# Patient Record
Sex: MALE | Race: WHITE | HISPANIC OR LATINO | Employment: FULL TIME | URBAN - METROPOLITAN AREA
[De-identification: names, ages, dates, MRNs, and addresses within clinical notes are randomized per-mention and may not be internally consistent; named-entity substitution may affect disease eponyms.]

---

## 2022-10-26 ENCOUNTER — LAB (OUTPATIENT)
Dept: LAB | Facility: CLINIC | Age: 37
End: 2022-10-26

## 2022-10-26 DIAGNOSIS — Z31.448 ENCOUNTER FOR OTHER GENETIC TESTING OF MALE FOR PROCREATIVE MANAGEMENT: Primary | ICD-10-CM

## 2022-10-26 DIAGNOSIS — Z31.448 ENCOUNTER FOR OTHER GENETIC TESTING OF MALE FOR PROCREATIVE MANAGEMENT: ICD-10-CM

## 2022-10-26 PROCEDURE — 36415 COLL VENOUS BLD VENIPUNCTURE: CPT

## 2022-10-26 NOTE — PROGRESS NOTES
[facility]  Genetic Counseling Consult    Patient:  Gabriel Rosales YOB: 1985   Date of Service:  10/26/22      Gabriel was seen at the [facility] for genetic consultation to discuss the option of carrier screening.         Impression/Plan:   1. Gabriel elected to pursue expanded carrier screening as part of his partner, Lisa's ongoing pregnancy management. A sample was drawn today and sent to [facility]. Results are expected within 2-3 weeks, and will be released in [employer]. We will contact him to discuss the results. Gabriel provided verbal permission for results to be left in his voicemail. Authorization to share protected health information was signed today for us to discuss results with his partner.      Expanded carrier screening for mutations in a large panel of genes associated with autosomal recessive conditions including cystic fibrosis, thalassemias, hearing loss, spinal muscular atrophy, and others, is now available. We also reviewed that expanded carrier screening can assess for common X-linked recessive disorders such as Fragile X syndrome.       We discussed that expanded carrier screening is designed to identify carrier status for conditions that are primarily childhood or adolescent onset. Expanded carrier screening does not evaluate for adult-onset conditions such as hereditary cancer syndromes, dementia/ Alzheimer's disease, or cardiovascular disease risk factors. Additionally, expanded carrier screening is not comprehensive for all known genetic diseases or inherited conditions. It will not intentionally screen for autosomal dominant conditions. This is a screening test, and residual carrier status risk figures will be provided to the patient after results become available. Carrier screening is not meant to diagnose the patient with a condition, and generally carriers are asymptomatic. However, certain genes  may confer increased risks for various health concerns in carriers (including, but not limited to: DAWIT, DMD, FMR1).      We reviewed that carrier screening will report on variants classified by the lab as pathogenic or likely pathogenic. Although carrier status does not change over time, it is possible that a variant could be reclassified as more information about the variant is learned. If this occurs, the couple will be contacted and a new risk assessment will be provided.     It was a pleasure to be involved with Gabriel alfredito. Face-to-face time of the meeting was 68 minutes.    Sugey Walters MS, [facility]  Licensed Genetic Counselor  [facility]  Maternal Fetal Medicine  [email or web address]  [phone]

## 2022-11-15 ENCOUNTER — TELEPHONE (OUTPATIENT)
Dept: MATERNAL FETAL MEDICINE | Facility: CLINIC | Age: 37
End: 2022-11-15

## 2022-11-15 NOTE — TELEPHONE ENCOUNTER
November 15, 2022    Gabriel returned my call and we reviewed the information described below. Low reproductive risk from carrier screening.     Sugey Walters MS, [facility]  Licensed Genetic Counselor  [facility]  Maternal Fetal Medicine  [email or web address]  [phone]

## 2022-11-15 NOTE — TELEPHONE ENCOUNTER
November 15, 2022     I called Gabriel to discuss the results of his and his partner partner, Shabnam's, comprehensive carrier screening.      The couple were not mutually positive for any of the conditions screened and Shabnam did not screen positive for any x-linked conditions. This significantly reduces the couple's reproductive risk for these conditions.      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 Cilia are involved with cell movement and signaling. There are many different types of BBS caused by mutations in different genes.    Symptoms of BBS generally include alice-cone dystrophy, which leads to progressive vision loss, kidney anomalies, genital differences, intellectual disability, infertility, and polydactyly. Some individuals with this condition may have internal organs that are reversed (either situs inversus totalis or heterotaxy).    Symptoms of BBS can vary widely and some individuals may not have obvious symptoms.    Since Gabriel was NOT identified to be a carrier of this condition, the couple's residual reproductive risk is 1 in 141,200.     Primary hyperoxaluria type 1 (AGXT: c.33dup):    Individuals with this condition produce too much oxalate/oxalic acid. Oxalate combines with calcium to form crystals in the kidneys and other organs.     Symptoms include kidney stones and reduced kidney function before age 25. In the most severe cases, symptoms begin at four to six months of age. There are even more mild forms in which individuals may not present until adulthood.     Approximately half of affected individuals will have kidney failure by age 25, leading to the deposition of oxalate crystals in the tissues of the body. Without treatment, this condition can be fatal.     Treatment may include liver and kidney  "transplant.    Since Gabriel was NOT identified to be a carrier of this condition, the residual reproductive risk is 1 in 53,600.     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     There are different forms of this condition, with type 0 being the most severe and type IV being the most mild. Type 0 SMA presents in utero and type IV presents in adulthood.     The different types of SMA are dependent on the copies of SMN2, a gene that is very similar to SMN1.     Deletions of the SMN1 gene are the most common causes of SMA, though point mutations are also possible. Typically, healthy individuals have two copies of SMN1, though sometimes they can have more.     Occasionally, someone has two copies of SMN1 on one chromosome and no copies of the gene on the other chromosome. These individuals are called \"silent carriers\" as they have the typical copies of the gene, but have the possibility of passing on no copies of the gene. They are also called 2+0 carriers. When the c.*3+80T>G is present, the 2+0 configuration is more likely.     For someone of  descent, the presence of the SNP increases the chance to be a 2+0 carrier to 1 in 42.     Since Shabnam was NOT identified to be a carrier, the residual reproductive risk is 1 in 147,840.     Any of the couple's children will have up to a 50% chance of being carriers of these conditions. This is also true for the couple's siblings. Other family members could also be at risk to be carriers and the couple was encouraged to share this information with their families.      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 " This variant is classified as benign by [employer]. In combination with a classic GALT variant, it may reduce enzyme activity which can trigger a positive for galactosemia on [place] screening. However, it does not require dietary intervention and does not cause significant clinical consequences.        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 Pseudodeficiency alleles are NOT associated with carrier status or disease, but can exhibit false positive results on biochemical tests such as  screening.     I encouraged Shabnam to call me with any questions regarding this information. Otherwise, I will work with Jackeline Ware to coordinate care going forward if needed.      Sugey Walters MS, [facility]  Licensed Genetic Counselor  [facility]  Maternal Fetal Medicine  [email or web address]  [phone]

## 2022-11-20 ENCOUNTER — HEALTH MAINTENANCE LETTER (OUTPATIENT)
Age: 37
End: 2022-11-20

## 2022-12-02 ENCOUNTER — TELEPHONE (OUTPATIENT)
Dept: ONCOLOGY | Facility: CLINIC | Age: 37
End: 2022-12-02

## 2022-12-02 ENCOUNTER — TRANSCRIBE ORDERS (OUTPATIENT)
Dept: ONCOLOGY | Facility: CLINIC | Age: 37
End: 2022-12-02

## 2022-12-02 DIAGNOSIS — Z13.79 GENOMIC CANCER RISK ASSESSMENT: Primary | ICD-10-CM

## 2022-12-02 NOTE — TELEPHONE ENCOUNTER
12/2/2022    I returned Gabriel's phone call today, as he left me a voicemail requesting a call back to schedule an appointment. Gabriel shared that his wife, Shabnam Montano, spoke with me this morning to discuss her SDHB gene mutation. Given the potential for autosomal recessive Mitochondrial complex II deficiency associated with SDHB mutations and that Shabnam is currently pregnant, Gabriel requested an urgent genetic counseling visit with me to discuss his own genetic testing.    I explained that I will have my schedulers contact him to schedule a virtual visit with me or some time in the next 1-2 weeks. Gabriel verbalized agreement with this plan and denied having any other questions at this time.    Jackeline Ware MS, [facility]  Licensed, Certified Genetic Counselor  Office: [phone]  Pager: [phone]

## 2022-12-05 ENCOUNTER — VIRTUAL VISIT (OUTPATIENT)
Dept: ONCOLOGY | Facility: CLINIC | Age: 37
End: 2022-12-05
Attending: GENETIC COUNSELOR, MS

## 2022-12-05 ENCOUNTER — PRE VISIT (OUTPATIENT)
Dept: ONCOLOGY | Facility: CLINIC | Age: 37
End: 2022-12-05

## 2022-12-05 DIAGNOSIS — Z13.79 GENOMIC CANCER RISK ASSESSMENT: ICD-10-CM

## 2022-12-05 DIAGNOSIS — Z84.81 FAMILY HISTORY OF GENETIC DISEASE CARRIER: Primary | ICD-10-CM

## 2022-12-05 DIAGNOSIS — Z80.41 FAMILY HISTORY OF MALIGNANT NEOPLASM OF OVARY: ICD-10-CM

## 2022-12-05 PROCEDURE — 96040 HC GENETIC COUNSELING, EACH 30 MINUTES: CPT | Mod: GT | Performed by: GENETIC COUNSELOR, MS

## 2022-12-05 NOTE — LETTER
12/5/2022         RE: Gabriel Rosales  [address] [place] [address]        Dear Colleague,    Thank you for referring your patient, Gabriel Rosales, to the [facility]. Please see a copy of my visit note below.    12/5/2022    Gabriel is a 37 year old who is being evaluated via a billable video visit.      How would you like to obtain your AVS? MyChart  If the video visit is dropped, the invitation should be resent by: Text to cell phone: [phone]  Will anyone else be joining your video visit? Argenis Wilkins VF    Video-Visit Details  Video Start Time: 10:16am  Type of service:  Video Visit  Video End Time:10:53am  Originating Location (pt. Location): Home  Distant Location (provider location):  Off-site  Platform used for Video Visit: [facility]     Referring Provider: self-referred    Presenting Information:   Given concerns regarding the potential for COVID-19 exposure during a clinic visit, Gabriel elected for a video genetic counseling visit through the Cancer Risk Management Program to discuss his family history of cancer. We reviewed this history, cancer screening recommendations, and available genetic testing options.    Personal History:  Gabriel is a 37 year old male. He does not have any personal history of cancer and does not regularly do any cancer screening at this time.    Family History: (Please see scanned pedigree for detailed family history information)    Gabriel's partner is currently pregnant and due May 2023. She recently pursued genetic testing due to a family history of Hereditary Paraganglioma-Pheochromocytoma syndrome and was found to carry a SDHB gene mutation.    Gabriel's maternal aunt was diagnosed with ovarian cancer in her 50's and passed away in her 60's.    Gabriel reports that he has limited information regarding his paternal biological relatives.    His maternal ethnicity is [place] and [place]. His paternal ethnicity is  [place] and [place]. There is no known Ashkenazi Buddhism ancestry on either side of his family.    Discussion:    We reviewed the features of sporadic, familial, and hereditary cancers. In looking at Gabriel's maternal family history, it is possible that a cancer susceptibility gene is present as his maternal aunt was diagnosed with ovarian cancer and approximately 20-25% of ovarian cancer has a hereditary explanation. We also discussed the significance of his partner's history of a SDHB gene mutation on their current pregnancy.    We first discussed the natural history and genetics of mitochondrial complex II deficiency as related to SDHB gene mutation.     We reviewed that mutations in the SDHB gene can cause mitochondrial complex II deficiency. Individuals with this condition typically develop symptoms in early infancy, which can affect the brain, growth, heart, eyes, muscles, and other organ systems.    Individuals with this condition carry two SDHB gene mutations, one mutation inherited from each parent. If both parents carry a SDHB gene mutation, each of their pregnancies together will have a 25% chance to inherit both mutations and could have the condition. Given that Gabriel's partner carries a SDHB gene mutation, it would be reasonable for Gabriel to consider testing of the SDHB gene to better understand risks to their current pregnancy. If he does carry a mutation, the above risks apply. If he does not carry a mutation, their pregnancy will have a 50% chance to carry the single mutation identified in Gabriel's partner.     We then discussed the impact of a single SDHB gene mutation on Gabriel's own medical care. If he does carry a SDHB gene mutation, he would have Hereditary Paraganglioma-Pheochromocytoma (PGL-PCC) syndrome and be at risk for the associated cancers (paragangliomas, pheochromocytomas, etc.) and increased screening would be indicated (i.e. regular MRIs, annual blood/urine tests, etc.). Of note, we  discussed that it is uncertain if the same mutations that cause mitochondrial complex II deficiency also cause PGL-PCC syndrome.    We then discussed his family history of ovarian cancer.    We reviewed that the most common cause of hereditary ovarian cancer is HBOC syndrome, which is caused by mutations in the BRCA1 and BRCA2 genes. Individuals with HBOC syndrome are at increased risk for several different cancers, including breast, ovarian, male breast, prostate, melanoma, and pancreatic cancer.    We then discussed that there are additional genes that could cause increased risk for ovarian and related cancers. As many of these genes present with overlapping features in a family and accurate cancer risk cannot always be established based upon the pedigree analysis alone, it would be reasonable for Gabriel to consider panel genetic testing to analyze multiple genes at once.    Based on his family history, Gabriel meets current National Comprehensive Cancer Network (NCCN) criteria for genetic testing of high penetrance ovarian cancer genes (i.e. BRCA1, BRCA2, BRIP1, RAD51C, RAD51D, Parrish syndrome genes, etc.).      A detailed handout regarding these genes/syndromes and the information we discussed was provided to Gabriel at the end of our appointment today and can be found in the after visit summary. Topics included: inheritance pattern, cancer risks, cancer screening recommendations, and also risks, benefits and limitations of testing.    We reviewed genetic testing options for hereditary gynecologic and related cancers: targeted gynecologic cancers + SDHB gene panel (CustomNext-Cancer, 21 genes) and expanded pan-cancer panels (CancerNext or CancerNext-Expanded + SDHB). Gabriel expressed interest in targeted testing. He opted for the CustomNext-Cancer 21-gene panel.    Genetic testing is available for 21 genes associated with hereditary gynecologic and related cancers: DAWIT, BARD1, BRCA1, BRCA2, BRIP1, CDH1, CHEK2, EPCAM,  MLH1, MSH2, MSH6, NBN, NF1, PALB2, PMS2, PTEN, RAD51C, RAD51D, SDHB, STK11, TP53.    We discussed that some of the genes in the panel are associated with specific hereditary cancer syndromes and published management guidelines: Hereditary Paraganglioma-Pheochromocytoma syndrome (SDHB), Hereditary Breast and Ovarian Cancer syndrome (BRCA1, BRCA2), Parrish syndrome (MLH1, MSH2, MSH6, PMS2, EPCAM), Hereditary Diffuse Gastric Cancer (CDH1), Cowden syndrome (PTEN), Li Fraumeni syndrome (TP53), Peutz-Jeghers syndrome (STK11), and Neurofibromatosis type 1 (NF1).      Published guidelines to management certain cancer risks are also associated with mutations in the DAWIT, BRIP1, BARD1, CHEK2, PALB2, RAD51C, and RAD51D genes.    Consent was obtained over the video and Gabriel elected to schedule a lab appointment at his earliest convenience. Once his blood is drawn, genetic testing using the 21 gene CustomNext-Cancer panel will be sent to [facility]. Turn around time: 2-3 weeks after Amador receives his blood sample.    Medical Management: For Gabriel, we reviewed that the information from genetic testing may determine:    risks to their current pregnancy and/or future biological children,    additional cancer screening for which Gabriel may qualify (i.e. regular MRIs, annual blood/urine tests, annual clinical breast exams, more frequent colonoscopies, more frequent dermatologic exams, etc.),    and targeted chemotherapies if he were to develop certain cancers in the future (i.e. immunotherapy for individuals with Parrish syndrome, PARP inhibitors, etc.).     These recommendations and possible targeted chemotherapies will be discussed in detail once genetic testing is completed.     Plan:  1) Today Gabriel elected to proceed with genetic testing using the 21 gene CustomNext-Cancer panel offered by [facility]. Gabriel will schedule a lab appointment at his earliest convenience.  2) The results should be available 2-3 weeks  after Amador receives his blood sample.  3) Gabriel will be contacted to schedule a virtual visit to discuss the results.    Jackeline Ware MS, [facility]  Licensed, Certified Genetic Counselor  Office: [phone]  Pager: [phone]      Again, thank you for allowing me to participate in the care of your patient.        Sincerely,        Jackeline Ware, GC

## 2022-12-05 NOTE — PROGRESS NOTES
12/5/2022    Gabriel is a 37 year old who is being evaluated via a billable video visit.      How would you like to obtain your AVS? MyChart  If the video visit is dropped, the invitation should be resent by: Text to cell phone: [phone]  Will anyone else be joining your video visit? Argenis Wilkins VF    Video-Visit Details  Video Start Time: 10:16am  Type of service:  Video Visit  Video End Time:10:53am  Originating Location (pt. Location): Home  Distant Location (provider location):  Off-site  Platform used for Video Visit: [employer]     Referring Provider: self-referred    Presenting Information:   Given concerns regarding the potential for COVID-19 exposure during a clinic visit, Gabriel elected for a video genetic counseling visit through the Cancer Risk Management Program to discuss his family history of cancer. We reviewed this history, cancer screening recommendations, and available genetic testing options.    Personal History:  Gabriel is a 37 year old male. He does not have any personal history of cancer and does not regularly do any cancer screening at this time.    Family History: (Please see scanned pedigree for detailed family history information)    Gabriel's partner is currently pregnant and due May 2023. She recently pursued genetic testing due to a family history of Hereditary Paraganglioma-Pheochromocytoma syndrome and was found to carry a SDHB gene mutation.    Gabriel's maternal aunt was diagnosed with ovarian cancer in her 50's and passed away in her 60's.    Gabriel reports that he has limited information regarding his paternal biological relatives.    His maternal ethnicity is Nepali and German. His paternal ethnicity is German and Japanese. There is no known Ashkenazi Islam ancestry on either side of his family.    Discussion:    We reviewed the features of sporadic, familial, and hereditary cancers. In looking at Gabriel's maternal family history, it is possible that a cancer  susceptibility gene is present as his maternal aunt was diagnosed with ovarian cancer and approximately 20-25% of ovarian cancer has a hereditary explanation. We also discussed the significance of his partner's history of a SDHB gene mutation on their current pregnancy.    We first discussed the natural history and genetics of mitochondrial complex II deficiency as related to SDHB gene mutation.     We reviewed that mutations in the SDHB gene can cause mitochondrial complex II deficiency. Individuals with this condition typically develop symptoms in early infancy, which can affect the brain, growth, heart, eyes, muscles, and other organ systems.    Individuals with this condition carry two SDHB gene mutations, one mutation inherited from each parent. If both parents carry a SDHB gene mutation, each of their pregnancies together will have a 25% chance to inherit both mutations and could have the condition. Given that Gabriel's partner carries a SDHB gene mutation, it would be reasonable for Gabriel to consider testing of the SDHB gene to better understand risks to their current pregnancy. If he does carry a mutation, the above risks apply. If he does not carry a mutation, their pregnancy will have a 50% chance to carry the single mutation identified in Gabriel's partner.     We then discussed the impact of a single SDHB gene mutation on Gabriel's own medical care. If he does carry a SDHB gene mutation, he would have Hereditary Paraganglioma-Pheochromocytoma (PGL-PCC) syndrome and be at risk for the associated cancers (paragangliomas, pheochromocytomas, etc.) and increased screening would be indicated (i.e. regular MRIs, annual blood/urine tests, etc.). Of note, we discussed that it is uncertain if the same mutations that cause mitochondrial complex II deficiency also cause PGL-PCC syndrome.    We then discussed his family history of ovarian cancer.    We reviewed that the most common cause of hereditary ovarian cancer is  HBOC syndrome, which is caused by mutations in the BRCA1 and BRCA2 genes. Individuals with HBOC syndrome are at increased risk for several different cancers, including breast, ovarian, male breast, prostate, melanoma, and pancreatic cancer.    We then discussed that there are additional genes that could cause increased risk for ovarian and related cancers. As many of these genes present with overlapping features in a family and accurate cancer risk cannot always be established based upon the pedigree analysis alone, it would be reasonable for Gabriel to consider panel genetic testing to analyze multiple genes at once.    Based on his family history, Gabriel meets current National Comprehensive Cancer Network (NCCN) criteria for genetic testing of high penetrance ovarian cancer genes (i.e. BRCA1, BRCA2, BRIP1, RAD51C, RAD51D, Parrish syndrome genes, etc.).      A detailed handout regarding these genes/syndromes and the information we discussed was provided to Gabriel at the end of our appointment today and can be found in the after visit summary. Topics included: inheritance pattern, cancer risks, cancer screening recommendations, and also risks, benefits and limitations of testing.    We reviewed genetic testing options for hereditary gynecologic and related cancers: targeted gynecologic cancers + SDHB gene panel (CustomNext-Cancer, 21 genes) and expanded pan-cancer panels (CancerNext or CancerNext-Expanded + SDHB). Gabriel expressed interest in targeted testing. He opted for the CustomNext-Cancer 21-gene panel.    Genetic testing is available for 21 genes associated with hereditary gynecologic and related cancers: DAWIT, BARD1, BRCA1, BRCA2, BRIP1, CDH1, CHEK2, EPCAM, MLH1, MSH2, MSH6, NBN, NF1, PALB2, PMS2, PTEN, RAD51C, RAD51D, SDHB, STK11, TP53.    We discussed that some of the genes in the panel are associated with specific hereditary cancer syndromes and published management guidelines: Hereditary  Paraganglioma-Pheochromocytoma syndrome (SDHB), Hereditary Breast and Ovarian Cancer syndrome (BRCA1, BRCA2), Parrish syndrome (MLH1, MSH2, MSH6, PMS2, EPCAM), Hereditary Diffuse Gastric Cancer (CDH1), Cowden syndrome (PTEN), Li Fraumeni syndrome (TP53), Peutz-Jeghers syndrome (STK11), and Neurofibromatosis type 1 (NF1).      Published guidelines to management certain cancer risks are also associated with mutations in the DAWIT, BRIP1, BARD1, CHEK2, PALB2, RAD51C, and RAD51D genes.    Consent was obtained over the video and Gabriel elected to schedule a lab appointment at his earliest convenience. Once his blood is drawn, genetic testing using the 21 gene CustomNext-Cancer panel will be sent to [facility]. Turn around time: 2-3 weeks after Amador receives his blood sample.    Medical Management: For Gabriel, we reviewed that the information from genetic testing may determine:    risks to their current pregnancy and/or future biological children,    additional cancer screening for which Gabriel may qualify (i.e. regular MRIs, annual blood/urine tests, annual clinical breast exams, more frequent colonoscopies, more frequent dermatologic exams, etc.),    and targeted chemotherapies if he were to develop certain cancers in the future (i.e. immunotherapy for individuals with Parrish syndrome, PARP inhibitors, etc.).     These recommendations and possible targeted chemotherapies will be discussed in detail once genetic testing is completed.     Plan:  1) Today Gabriel elected to proceed with genetic testing using the 21 gene CustomNext-Cancer panel offered by [facility]. Gabriel will schedule a lab appointment at his earliest convenience.  2) The results should be available 2-3 weeks after Amador receives his blood sample.  3) Gabriel will be contacted to schedule a virtual visit to discuss the results.    Jackeline Ware MS, [facility] [place]  Licensed, Certified Genetic Counselor  Office: [phone]  Pager: [phone]

## 2022-12-05 NOTE — LETTER
Date:December 9, 2022      Provider requested that no letter be sent. Do not send.       [facility]

## 2022-12-09 ENCOUNTER — LAB (OUTPATIENT)
Dept: LAB | Facility: CLINIC | Age: 37
End: 2022-12-09

## 2022-12-09 DIAGNOSIS — Z80.41 FAMILY HISTORY OF MALIGNANT NEOPLASM OF OVARY: ICD-10-CM

## 2022-12-09 DIAGNOSIS — Z84.81 FAMILY HISTORY OF GENETIC DISEASE CARRIER: ICD-10-CM

## 2022-12-09 PROCEDURE — 36415 COLL VENOUS BLD VENIPUNCTURE: CPT

## 2022-12-09 PROCEDURE — 99000 SPECIMEN HANDLING OFFICE-LAB: CPT

## 2022-12-09 NOTE — PATIENT INSTRUCTIONS
Conditions discussed today:  Hereditary Paraganglioma-Pheochromocytoma syndrome due to a SDHB mutation  Hereditary Breast and Ovarian Cancer syndrome    Assessing Cancer Risk  Only about 5-10% of cancers are thought to be due to an inherited cancer susceptibility gene.    These families often have:  Several people with the same or related types of cancer  Cancers diagnosed at a young age (before age 50)  Individuals with more than one primary cancer  Multiple generations of the family affected with cancer    Genetic Testing  Genetic testing involves a simple blood test and will look at the genetic information in select genes for any harmful mutations that are associated with increased cancer risk.  If possible, it is recommended that the person(s) who has had cancer be tested before other family members.  That person will give us the most useful information about whether or not a specific gene is associated with the cancer in the family.     Results  There are three possible results of genetic testing:  Positive--a harmful mutation was identified  Negative--no mutation was identified  Variant of unknown significance--a variation in one of the genes was identified, but it is unclear how this impacts cancer risk in the family    Advantages and Disadvantages  There are advantages and disadvantages to genetic testing of these genes.    Advantages  May clarify your cancer risk  Can help you make medical decisions  May explain the cancers in your family  May give useful information to your family members (if you share your results)    Disadvantages  Possible negative emotional impact of learning about inherited cancer risk  Uncertainty in interpreting a negative test result in some situations  Possible genetic discrimination concerns (see below)    Inheritance   Mutations in most cancer risk genes are inherited in an autosomal dominant pattern.  This means that if a parent has a mutation, each of his or her  children will have a 50% chance of inheriting that same mutation.  Therefore, each child--male or female--would have a 50% chance of being at increased risk for developing cancer.                                              Image obtained from Genetics Home Reference, 2013     Genetic Information Nondiscrimination Act (GLADYS)  GLADYS is a federal law that protects individuals from health insurance or employment discrimination based on a genetic test result alone.  Although rare, there are currently no legal protections in terms of life insurance, long term care, or disability insurances.  Visit the National [employer] [place] at Genome.gov/00590477 to learn more.    Reducing Cancer Risk  If a harmful mutation is found in a cancer risk gene, there may be certain screens or preventative surgeries that can be offered. This information will be discussed after genetic testing is completed. If no mutations are found on genetic testing, screening is then recommended based on personal and/or family history of cancer.     Questions to Think About Regarding Genetic Testing  What effect will the test result have on me and my relationship with my family members if I have an inherited gene mutation?  If I don t have a gene mutation?  Should I share my test results, and how will my family react to this news, which may also affect them?  Are my children ready to learn new information that may one day affect their own health?    Resources  American Cancer Society (ACS) cancer.org   National Cancer [place] (NCI) cancer.gov     Please call us if you have any questions or concerns.   Cancer Risk Management Program [phone]-CANCER ([phone])  Fabrice Ruano, MS [facility] [place]  [phone]  Lucy Vogel, MS, [facility] [place] [phone]  Avril Armenta, MS, [facility] [place]  [phone]  Shahana Max, MS, [facility] [place]  [phone]  Meera Phillips, MS, [facility] [place]  [phone]  Jackeline Ware, MS, [facility] [place] [phone]  Jacki Hardy, MS, [facility] [place] [phone]

## 2022-12-23 ENCOUNTER — TELEPHONE (OUTPATIENT)
Dept: ONCOLOGY | Facility: CLINIC | Age: 37
End: 2022-12-23

## 2022-12-23 NOTE — LETTER
Cancer Risk Management  Program Locations    [facility]  [facility]  [facility]  Mailing Address  Cancer Risk Management Program  [address]  [place] [address]    New patient appointments  [phone]  January 11, 2023    Gabriel Rosales  [address] [facility] [address]      Dear Gabriel,    It was a pleasure speaking with you over the phone recently regarding your genetic testing results. Here is a copy of the progress note summarizing our conversation. If you have any additional questions, please feel free to call.    12/23/2022    Referring Provider: self-referred    Presenting Information:  I spoke to Gabriel by phone today to discuss his genetic testing results. We last spoke on 12/5/2022 and his blood was drawn on 12/9/2022. The CustomNext-Cancer Panel was ordered from [facility]. This testing was done because of Gabriel's family history of ovarian cancer and the SDHB mutation identified in his partner.    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 No other variants or mutations were found in the CHEK2 gene. Given the uncertain significance of this result, medical management decisions should NOT be made based on this test result alone.    Of note, Gabriel tested negative for variants and mutations in the following genes by sequencing and deletion/duplication analysis: DAWIT, BARD1, BRCA1, BRCA2, BRIP1, CDH1, EPCAM (deletions/duplications only), MLH1, MSH2, MSH6, NBN, NF1, PALB2, PMS2, PTEN, RAD51C, RAD51D, SDHB, STK11, TP53.     We reviewed the autosomal dominant inheritance of these genes. This means Gabriel cannot pass on a mutation in any of these genes to his  "biological children based on this test result. Mutations in these genes do not skip generations.      We specifically discussed that based on this test result, his biological children are not expected to be at risk for SDHB-associated mitochondrial complex II deficiency. His children will still have a 50% chance to inherit the SDHB mutation identified in their mother and genetic testing should be considered prior to 6-10 years old. Genetic testing for the maternal SDHB mutation could also be considered during future pregnancies; Gabriel and his partner would be encouraged to discuss this testing in more detail with a prenatal genetic counselor, if they are interested.     A copy of the test report can be found in the Laboratory tab, dated 12/9/2022, and named \"LABORATORY MISCELLANEOUS ORDER\". The report is scanned in as a linked document.    Interpretation:  We discussed several different interpretations of this inconclusive test result. It is not clear if this variant in the CHEK2 gene is associated with increased cancer risk.  1. This variant may be a benign change that does not increase cancer risk.  2. This variant may be a harmful mutation associated with increased risks for certain cancers (breast, colon, etc.).    Genetic testing labs are working to collect evidence to determine if this variant is harmful or benign, and [facility] will contact me if it is reclassified. If this variant is determined to be a benign change, there may be a different gene or combination of genes and environment that are associated with the cancers in this family that are not identifiable using this test.    It is also important to consider that his maternal aunt with ovarian cancer may have had a mutation in one of the genes tested and Gabriel did not inherit it.    Inheritance:  We reviewed the autosomal dominant inheritance of this variant in the CHEK2 gene. We discussed that Gabriel has a 50% chance to pass this variant to each " of his biological children. Other relatives may also carry the same variant. Because it is unclear what, if any, risk is associated with this variant, clinical genetic testing for this CHEK2 variant alone is not recommended for relatives.    Screening:  Based on this inconclusive test result, it is important for Gabriel to refer back to the family history for appropriate cancer screening.      Population cancer screening options, such as those recommended by the American Cancer Society and the National Comprehensive Cancer Network (NCCN), are appropriate for Gabriel.     These screening recommendations may change if there are changes to Gabriel's personal and/or family history of cancer. Final screening recommendations should be made by each individual's primary care provider.      Additional Testing Considerations:  We discussed that it is still possible Gabriel does carry a currently identifiable gene mutation that increases his risk for certain cancers. As such, the option remains of pursuing a larger gene panel covering more genes associated with hereditary cancer. Gabriel is encouraged to contact me if he wishes to readdress these larger gene panel options.    Also, although Gabriel's genetic testing result is inconclusive, other relatives may still carry a harmful gene mutation associated with hereditary ovarian cancer. Genetic counseling is recommended for Gabriel's mother and other maternal relatives to discuss their genetic testing options. If any of these relatives do pursue genetic testing, Gabriel is encouraged to contact me so that we may review the impact of their test results on him.    Summary:  Gabriel was not found to carry a harmful mutation in the SDHB gene, nor in the other 20 genes included in his genetic test. While no obviously harmful genetic changes were identified, Gabriel may still be at risk for certain cancers due to family history, environmental factors, or other genetic causes not identified by this  test. Because of that, it is important that he continue with cancer screening based on his personal and family history as discussed above.    Genetic testing is rapidly advancing, and new cancer susceptibility genes will most likely be identified in the future. Therefore, I encouraged Gabriel to contact me regularly or if there are changes in his personal or family history. This may change how we assess his cancer risk, screening, and the testing we would offer.    Plan:  1. At his request, I provided Gabriel with a copy of his test results via [employer] portal.    2. He plans to follow-up with his medical providers, as needed.  3. He should contact me regularly, or sooner if his family history changes.  4. I will attempt to contact Gabriel if the laboratory informs me that this CHEK2 variant has been reclassified. This may change screening and testing recommendations for Gabriel and his relatives.    If Gabriel has any further questions, I encouraged him to contact me at [phone].    Jackeline Ware MS, [facility] [place]  Licensed, Certified Genetic Counselor  Office: [phone]  Pager: [phone]

## 2022-12-23 NOTE — TELEPHONE ENCOUNTER
12/23/2022    Referring Provider: self-referred    Presenting Information:  I spoke to Gabriel by phone today to discuss his genetic testing results. We last spoke on 12/5/2022 and his blood was drawn on 12/9/2022. The [facility]Can[facility] Panel was ordered from [facility]. This testing was done because of Gabriel's family history of ovarian cancer and the SDHB mutation identified in his partner.    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 No other variants or mutations were found in the CHEK2 gene. Given the uncertain significance of this result, medical management decisions should NOT be made based on this test result alone.    Of note, Gabriel tested negative for variants and mutations in the following genes by sequencing and deletion/duplication analysis: DAWIT, BARD1, BRCA1, BRCA2, BRIP1, CDH1, EPCAM (deletions/duplications only), MLH1, MSH2, MSH6, NBN, NF1, PALB2, PMS2, PTEN, RAD51C, RAD51D, SDHB, STK11, TP53.     We reviewed the autosomal dominant inheritance of these genes. This means Gabriel cannot pass on a mutation in any of these genes to his biological children based on this test result. Mutations in these genes do not skip generations.      We specifically discussed that based on this test result, his biological children are not expected to be at risk for SDHB-associated mitochondrial complex II deficiency. His children will still have a 50% chance to inherit the SDHB mutation identified in their mother and genetic testing should be considered prior to 6-10 years old. Genetic testing for the maternal SDHB mutation could also be considered during future pregnancies; Gabriel and his partner would be encouraged to discuss this testing in more detail with a prenatal genetic counselor, if they are interested.     A copy of the test report can be found in the  "Laboratory tab, dated 12/9/2022, and named \"LABORATORY MISCELLANEOUS ORDER\". The report is scanned in as a linked document.    Interpretation:  We discussed several different interpretations of this inconclusive test result. It is not clear if this variant in the CHEK2 gene is associated with increased cancer risk.  1. This variant may be a benign change that does not increase cancer risk.  2. This variant may be a harmful mutation associated with increased risks for certain cancers (breast, colon, etc.).    Genetic testing labs are working to collect evidence to determine if this variant is harmful or benign, and [facility] will contact me if it is reclassified. If this variant is determined to be a benign change, there may be a different gene or combination of genes and environment that are associated with the cancers in this family that are not identifiable using this test.    It is also important to consider that his maternal aunt with ovarian cancer may have had a mutation in one of the genes tested and Gabriel did not inherit it.    Inheritance:  We reviewed the autosomal dominant inheritance of this variant in the CHEK2 gene. We discussed that Gabriel has a 50% chance to pass this variant to each of his biological children. Other relatives may also carry the same variant. Because it is unclear what, if any, risk is associated with this variant, clinical genetic testing for this CHEK2 variant alone is not recommended for relatives.    Screening:  Based on this inconclusive test result, it is important for Gabriel to refer back to the family history for appropriate cancer screening.      Population cancer screening options, such as those recommended by the American Cancer Society and the National Comprehensive Cancer Network (NCCN), are appropriate for Gabriel.     These screening recommendations may change if there are changes to Gabriel's personal and/or family history of cancer. Final screening recommendations " should be made by each individual's primary care provider.      Additional Testing Considerations:  We discussed that it is still possible Gabriel does carry a currently identifiable gene mutation that increases his risk for certain cancers. As such, the option remains of pursuing a larger gene panel covering more genes associated with hereditary cancer. Gabriel is encouraged to contact me if he wishes to readdress these larger gene panel options.    Also, although Gabriel's genetic testing result is inconclusive, other relatives may still carry a harmful gene mutation associated with hereditary ovarian cancer. Genetic counseling is recommended for Gabriel's mother and other maternal relatives to discuss their genetic testing options. If any of these relatives do pursue genetic testing, Gabriel is encouraged to contact me so that we may review the impact of their test results on him.    Summary:  Gabriel was not found to carry a harmful mutation in the SDHB gene, nor in the other 20 genes included in his genetic test. While no obviously harmful genetic changes were identified, aGbriel may still be at risk for certain cancers due to family history, environmental factors, or other genetic causes not identified by this test. Because of that, it is important that he continue with cancer screening based on his personal and family history as discussed above.    Genetic testing is rapidly advancing, and new cancer susceptibility genes will most likely be identified in the future. Therefore, I encouraged Gabriel to contact me regularly or if there are changes in his personal or family history. This may change how we assess his cancer risk, screening, and the testing we would offer.    Plan:  1. At his request, I provided Gabriel with a copy of his test results via [facility]'s portal.    2. He plans to follow-up with his medical providers, as needed.  3. He should contact me regularly, or sooner if his family history changes.  4. I will attempt  to contact Gabriel if the laboratory informs me that this CHEK2 variant has been reclassified. This may change screening and testing recommendations for Gabriel and his relatives.    If Gabriel has any further questions, I encouraged him to contact me at [phone].    Jackeline Ware MS, JD [facility] [place]  Licensed, Certified Genetic Counselor  Office: [phone]  Pager: [phone]

## 2023-01-11 NOTE — PATIENT INSTRUCTIONS
Genetic Testing  Genetic testing involved a blood or saliva test which looked at the genetic information in select genes for variants associated with cancer risk.  This testing may have included analysis of a single gene due to a known variant in the family, multiple genes most associated with the cancers in a family, or an expanded panel of genes related to many types of cancers.    Results  There are several possible genetic test results, including:   Positive--a harmful mutation (also known as a  pathogenic  or  likely pathogenic  variant) was identified in a gene associated with increased cancer risk.  These risks, as well as medical management options, depend on the specific genetic variant identified.    Negative--no variants were identified in the genes analyzed   Variant of unknown significance--a variant was identified in one or more genes, though it is currently unclear how this impacts cancer risk in the family.  Genetic testing labs are working to collect evidence about these uncertain variants and may provide updates in the future.    What is a Variant of Unknown Significance?  A variant of unknown significance (VUS) is a genetic change with unclear clinical significance.  Scientists currently do not know if this specific variant is associated with increased cancer risks,  or if it is a benign (harmless) change with no impact on health.       A variant may be of uncertain significance for several reasons.  It is possible that this specific variant has not been seen before by the laboratory, or only in a small number of families.  There is currently not enough information to know how this variant may impact your health.          Reclassification  Genetic testing laboratories and researchers are collecting evidence to determine the importance of variants of unknown significance.  Many variants of uncertain significance are later reclassified as benign findings, however some may be associated with  increased cancer risk.  Laboratories will often notify the genetic counselor/ordering provider when a patient s VUS has been reclassified.        Some families may be candidates for participation in the laboratory s variant research programs to help determine the importance of their VUS.  Participating in these programs does not guarantee that families will learn the significance of their VUS immediately.  It could be months or years before a VUS is reclassified.       Screening Recommendations  A combination of personal and family history factors may inform cancer risk and medical management recommendations.  Population cancer screening options, such as those recommended by the American Cancer Society and the National Comprehensive Cancer Network (NCCN) are appropriate for many families at average risk for cancer.  However, earlier and/or more frequent screening may be recommended based on personal factors (lifestyle, exposures, medications, screening results), family history of cancer, and sometimes genetic factors.  These cancer risk management options should be discussed in more detail with an individual's medical providers.      It is usually not recommended that other relatives have genetic testing for the VUS unless it is done as part of the laboratory s variant research program because an individual s test results should not influence their cancer screening until we determine the importance of the VUS.  Your genetic counselor can help you and your relatives understand the risks and benefits of all genetic testing and cancer screening options.    Please call us if you have any questions or concerns.   Cancer Risk Management Program (Appointments: [phone])  Fabrice Ruano, MS [facility] [place]  [phone]  Lucy Vogel, MS, [facility] [phone]  Avril Armenta, MS, [facility]  [phone]  Shahana Max, MS, [facility]  [phone]  Meera Phillips, MS, [facility]  [phone]  Jackeline Ware, MS, [facility] [phone]  Jacki Hardy MS,  [facility] [place] [phone]

## 2023-11-14 LAB — SCANNED LAB RESULT: ABNORMAL

## 2023-11-25 ENCOUNTER — HEALTH MAINTENANCE LETTER (OUTPATIENT)
Age: 38
End: 2023-11-25

## 2024-04-18 LAB — SCANNED LAB RESULT: NORMAL

## 2025-01-04 ENCOUNTER — HEALTH MAINTENANCE LETTER (OUTPATIENT)
Age: 40
End: 2025-01-04